# Patient Record
Sex: MALE | Race: WHITE | NOT HISPANIC OR LATINO | Employment: OTHER | URBAN - METROPOLITAN AREA
[De-identification: names, ages, dates, MRNs, and addresses within clinical notes are randomized per-mention and may not be internally consistent; named-entity substitution may affect disease eponyms.]

---

## 2017-11-06 ENCOUNTER — COMMUNICATION - HEALTHEAST (OUTPATIENT)
Dept: ADMINISTRATIVE | Facility: CLINIC | Age: 77
End: 2017-11-06

## 2017-12-08 ENCOUNTER — OFFICE VISIT - HEALTHEAST (OUTPATIENT)
Dept: CARDIOLOGY | Facility: CLINIC | Age: 77
End: 2017-12-08

## 2017-12-08 DIAGNOSIS — I25.10 CORONARY ARTERY DISEASE: ICD-10-CM

## 2017-12-08 ASSESSMENT — MIFFLIN-ST. JEOR: SCORE: 1610.77

## 2019-03-15 ENCOUNTER — COMMUNICATION - HEALTHEAST (OUTPATIENT)
Dept: TELEHEALTH | Facility: CLINIC | Age: 79
End: 2019-03-15

## 2019-05-08 ENCOUNTER — OFFICE VISIT - HEALTHEAST (OUTPATIENT)
Dept: CARDIOLOGY | Facility: CLINIC | Age: 79
End: 2019-05-08

## 2019-05-08 DIAGNOSIS — I10 ESSENTIAL HYPERTENSION: ICD-10-CM

## 2019-05-08 ASSESSMENT — MIFFLIN-ST. JEOR: SCORE: 1631.18

## 2020-05-05 ENCOUNTER — OFFICE VISIT - HEALTHEAST (OUTPATIENT)
Dept: CARDIOLOGY | Facility: CLINIC | Age: 80
End: 2020-05-05

## 2020-05-05 DIAGNOSIS — I10 HTN (HYPERTENSION): ICD-10-CM

## 2021-05-28 NOTE — PROGRESS NOTES
[facility] Heart Care Note    Assessment/Plan:    Dima Allen is a 78 y.o. old male with:  1. HTN - imrpoved control at home - on lisinopril/metoprolol, can addition of chlorthalidone or increase metoprolol 50mg at night.   If add third medicaiton consider renal artery US to screen for stenosis.  Doing exercise  2. CAD on atorvastastin/asp      Dr. Roby Card  [facility] HEART CARE  [phone]  ______________________________________________________________________    Subjective:    I had the opportunity to see Dima Allen at the [facility]. Dima Allen is a 78 y.o. male with a known history of HTN and cAD s/p PCI, recent increase in lisinopril 20mg two times a day for HTN, at home BP cuff was measuring pressure lower than at the office.  Saw Dr. Sarmiento and /90, increased lisinopril 20mg two times a day, along metoprolol 25mg BP at home measuring 119 to 132 SBP.  He admits his BP higher when see's physician.  At  Home 130/79.   No chest pain/pressure, syncopale vent,s PND, rothopnean, edema.   ______________________________________________________________________      Review of Systems:   General: WNL  Eyes: WNL  Ears/Nose/Throat: WNL  Lungs: WNL  Heart: WNL  Stomach: WNL  Bladder: WNL  Muscle/Joints: WNL  Skin: WNL  Nervous System: WNL  Mental Health: WNL     Blood: WNL    Problem List:  Patient Active Problem List   Diagnosis     Dyslipidemia, goal LDL below 70     Essential hypertension     Coronary Artery Disease     Medical History:  Past Medical History:   Diagnosis Date     Dyslipidemia, goal LDL below 70 2011     Essential hypertension      Left bundle branch block (LBBB) 2011     Surgical History:  Past Surgical History:   Procedure Laterality Date     CORONARY STENT PLACEMENT  2011    two ALLISON to RCA     Social History:  No pertinent social hx related to patient's chief complaint other than above in subjective        Family History:  No pertinent family hx related  "to patient's chief complaint other than above in subjective      Allergies:  No Known Allergies    Medications:  Current Outpatient Medications   Medication Sig Dispense Refill     ascorbic Acid (VITAMIN C) 500 mg CpER Take 500 mg by mouth daily.       aspirin 81 mg chewable tablet Chew 81 mg 2 (two) times a day.        atorvastatin (LIPITOR) 80 MG tablet Take 1 tablet (80 mg total) by mouth daily. 90 tablet 3     lisinopril (PRINIVIL,ZESTRIL) 20 MG tablet Take 1 tablet (20 mg total) by mouth daily. 90 tablet 3     metoprolol succinate (TOPROL-XL) 25 MG Take 1 tablet (25 mg total) by mouth daily. 90 tablet 3     mv-min-FA-Zc-Lj-dspdwyi-lutein (MULTIVITAL) 0.4-162-18 mg Tab Take 1 tablet by mouth daily.       omega-3 fatty acids-vitamin E (FISH OIL) 1,000 mg cap Take 1 capsule by mouth 2 (two) times a day.       No current facility-administered medications for this visit.        Objective:   Vital signs:  /80 (Patient Site: Right Arm, Patient Position: Sitting, Cuff Size: Adult Regular)   Pulse 84   Resp 16   Ht 5' 11\" (1.803 m)   Wt 196 lb (88.9 kg)   BMI 27.34 kg/m        Physical Exam:    GENERAL APPEARANCE: Alert, cooperative and in no acute distress.  HEENT: No scleral icterus. No Xanthelasma. Oral mucuos membranes pink and moist.  NECK: JVP<6cm. No Hepatojugular reflux. Thyroid not visualized  CHEST: clear to auscultation  CARDIOVASCULAR: S1, S2 without murmur ,clicks or rubs. Brachial, radial and posterior tibial pulses are intact and symetric. No carotid bruits noted.    ABDOMEN: Nontender. BS+. No bruits.  EXTREMITIES: No cyanosis, clubbing or edema.    Lab Results:  LIPIDS:  Lab Results   Component Value Date    CHOL 139 11/22/2016    CHOL 121 06/11/2014    CHOL 122 03/14/2014     Lab Results   Component Value Date    HDL 50 11/22/2016    HDL 46 06/11/2014    HDL 44 03/14/2014     Lab Results   Component Value Date    LDLCALC 77 11/22/2016    LDLCALC 65 06/11/2014    LDLCALC 66 03/14/2014     Lab " Results   Component Value Date    TRIG 60 11/22/2016    TRIG 48 06/11/2014    TRIG 57 03/14/2014     No components found for: CHOLHDL    BMP:  Lab Results   Component Value Date    CREATININE 1.33 (H) 07/22/2015    BUN 26 07/22/2015     07/22/2015    K 5.2 (H) 07/22/2015     (H) 07/22/2015    CO2 21 (L) 07/22/2015         Roby Card MD  [facility]  [phone]

## 2021-05-31 VITALS — HEIGHT: 71 IN | WEIGHT: 191.5 LBS | BODY MASS INDEX: 26.81 KG/M2

## 2021-06-03 ENCOUNTER — RECORDS - HEALTHEAST (OUTPATIENT)
Dept: ADMINISTRATIVE | Facility: CLINIC | Age: 81
End: 2021-06-03

## 2021-06-03 VITALS — HEIGHT: 71 IN | BODY MASS INDEX: 27.44 KG/M2 | WEIGHT: 196 LBS

## 2021-06-04 VITALS
SYSTOLIC BLOOD PRESSURE: 121 MMHG | BODY MASS INDEX: 26.64 KG/M2 | WEIGHT: 191 LBS | DIASTOLIC BLOOD PRESSURE: 72 MMHG | HEART RATE: 72 BPM

## 2021-06-07 NOTE — PROGRESS NOTES
Review of Systems - History obtained from the patient  General ROS: negative  Psychological ROS: negative  Ophthalmic ROS: negative  ENT ROS: negative  Allergy and Immunology ROS: negative  Hematological and Lymphatic ROS: negative  Endocrine ROS: negative  Respiratory ROS: negative  Cardiovascular ROS: negative  Gastrointestinal ROS: negative  Genito-Urinary ROS: negative  Musculoskeletal ROS: negative  Neurological ROS: negative  Dermatological ROS: negative

## 2021-06-14 NOTE — PROGRESS NOTES
[facility] Heart Care Note    Assessment/Plan:    Dima Allen is a 77 y.o. old male with:  1. CAD s/p PCI -doing well, cont with medications, exercising on regular basis.  Plans to go to [place] in the winter.      Follow up in 16 mo    Dr. Roby Card  [facility] HEART CARE  [phone]  ______________________________________________________________________    Subjective:    I had the opportunity to see Dima Allen at the [facility]. Dima Allen is a 77 y.o. male with a known history of CAD s/p  PCI, recent labs 11/17: LDL 67; HDL 52; trigl 43, Cr 1.38, going on the treadmill with interval training.  No complaints.    ______________________________________________________________________      Review of Systems:   General: WNL  Eyes: WNL  Ears/Nose/Throat: WNL  Lungs: WNL  Heart: WNL  Stomach: WNL  Bladder: WNL  Muscle/Joints: WNL  Skin: WNL  Nervous System: WNL  Mental Health: WNL     Blood: WNL    Problem List:  Patient Active Problem List   Diagnosis     Dyslipidemia, goal LDL below 70     Essential hypertension     Coronary Artery Disease     Medical History:  Past Medical History:   Diagnosis Date     Dyslipidemia, goal LDL below 70 2011     Essential hypertension      Left bundle branch block (LBBB) 2011     Surgical History:  Past Surgical History:   Procedure Laterality Date     CORONARY STENT PLACEMENT  2011    two ALLISON to RCA     Social History:  No pertinent social hx related to patient's chief complaint other than above in subjective        Family History:  No pertinent family hx related to patient's chief complaint other than above in subjective      Allergies:  No Known Allergies    Medications:  Current Outpatient Prescriptions   Medication Sig Dispense Refill     ascorbic Acid (VITAMIN C) 500 mg CpER Take 500 mg by mouth daily.       aspirin 81 mg chewable tablet Chew 81 mg 2 (two) times a day.        atorvastatin (LIPITOR) 80 MG tablet Take 1 tablet (80 mg total) by  "mouth daily. 90 tablet 3     lisinopril (PRINIVIL,ZESTRIL) 20 MG tablet Take 1 tablet (20 mg total) by mouth daily. 90 tablet 3     metoprolol succinate (TOPROL-XL) 25 MG Take 1 tablet (25 mg total) by mouth daily. 90 tablet 3     mv-min-FA-Sl-Re-ptzvznp-lutein (MULTIVITAL) 0.4-162-18 mg Tab Take 1 tablet by mouth daily.       omega-3 fatty acids-vitamin E (FISH OIL) 1,000 mg cap Take 1 capsule by mouth 2 (two) times a day.       No current facility-administered medications for this visit.        Objective:   Vital signs:  /84 (Patient Site: Right Arm, Patient Position: Sitting, Cuff Size: Adult Regular)  Pulse 82  Resp 20  Ht 5' 11\" (1.803 m)  Wt 191 lb 8 oz (86.9 kg)  BMI 26.71 kg/m2      Physical Exam:    GENERAL APPEARANCE: Alert, cooperative and in no acute distress.  HEENT: No scleral icterus. No Xanthelasma. Oral mucuos membranes pink and moist.  NECK: JVP<6cm. No Hepatojugular reflux. Thyroid not visualized  CHEST: clear to auscultation  CARDIOVASCULAR: S1, S2 without murmur ,clicks or rubs. Brachial, radial and posterior tibial pulses are intact and symetric. No carotid bruits noted.    ABDOMEN: Nontender. BS+. No bruits.  EXTREMITIES: No cyanosis, clubbing or edema.    Lab Results:  LIPIDS:  Lab Results   Component Value Date    CHOL 139 11/22/2016    CHOL 121 06/11/2014    CHOL 122 03/14/2014     Lab Results   Component Value Date    HDL 50 11/22/2016    HDL 46 06/11/2014    HDL 44 03/14/2014     Lab Results   Component Value Date    LDLCALC 77 11/22/2016    LDLCALC 65 06/11/2014    LDLCALC 66 03/14/2014     Lab Results   Component Value Date    TRIG 60 11/22/2016    TRIG 48 06/11/2014    TRIG 57 03/14/2014     No components found for: CHOLHDL    BMP:  Lab Results   Component Value Date    CREATININE 1.33 (H) 07/22/2015    BUN 26 07/22/2015     07/22/2015    K 5.2 (H) 07/22/2015     (H) 07/22/2015    CO2 21 (L) 07/22/2015         Roby Card MD  [facility] HEART " [place] [phone]

## 2021-06-29 NOTE — PROGRESS NOTES
"Progress Notes by Roby Card MD at 5/5/2020  9:30 AM     Author: Roby Card MD Service: -- Author Type: Physician    Filed: 5/8/2020 10:49 AM Encounter Date: 5/5/2020 Status: Addendum    : Roby Card MD (Physician)    Related Notes: Original Note by Roby Card MD (Physician) filed at 5/5/2020  9:34 AM         Thank you, Dr. Sarmiento, for asking the [facility] Heart Care team to see Mr. Dima Allen to evaluate       Assessment/Recommendations   Assessment/Plan:  1. CAD s/p PCI - doing well, cont medical therapy,   2. HTN - on medical therapy    Doing great - follow up in 1 year    Total time 10 min Phone visit        History of Present Illness/Subjective    Mr. Dima Allen is a 79 y.o. male with CAD s/pPCI, and HTN no chest pain/pressure, \"it's been a good year\", blood pressure well controlled, his BP is higher with seeing physician, no syncopal events, tolerates his medication, taking lisinopril two times a day and more helpful in BP congtrol and no dizzy spells. He notes wt gain after going to [place] in the winter but at home more active.  Talked about hyperkalemia and diet, avoidng or reducing bananas, fruits, chocolate and encourage water.         Physical Examination Review of Systems   Vitals:    05/05/20 0910   BP: 121/72   Pulse: 72     Body mass index is 26.64 kg/m .  Wt Readings from Last 3 Encounters:   05/05/20 191 lb (86.6 kg)   05/08/19 196 lb (88.9 kg)   12/08/17 191 lb 8 oz (86.9 kg)     [unfilled]  General Appearance:      ENT/Mouth:    EYES:     Neck:    Chest/Lungs:      Cardiovascular:      Abdomen:     Extremities:    Skin:    Neurologic:    Psychiatric:     Review of Systems - 12 points nega other than above      Medical History  Surgical History Family History Social History   Past Medical History:   Diagnosis Date   ? Dyslipidemia, goal LDL below 70 2011   ? Essential hypertension    ? Left bundle branch block (LBBB) 2011 "    Past Surgical History:   Procedure Laterality Date   ? CORONARY STENT PLACEMENT  2011    two ALLISON to RCA    Family History   Problem Relation Age of Onset   ? Arthritis Mother    ? Stroke Father    ? No Medical Problems Son    ? Coronary Stenting Son 37   ? Obesity Son    ? No Medical Problems Daughter     Social History     Socioeconomic History   ? Marital status:      Spouse name: Lynda   ? Number of children: 3   ? Years of education: Not on file   ? Highest education level: Not on file   Occupational History   ? Occupation: mechanical/     Employer: [employer]     Comment: retired   Social Needs   ? Financial resource strain: Not on file   ? Food insecurity     Worry: Not on file     Inability: Not on file   ? Transportation needs     Medical: Not on file     Non-medical: Not on file   Tobacco Use   ? Smoking status: Never Smoker   ? Smokeless tobacco: Never Used   Substance and Sexual Activity   ? Alcohol use: Yes     Comment: wine rarely   ? Drug use: No   ? Sexual activity: Yes     Partners: Female     Birth control/protection: Post-menopausal   Lifestyle   ? Physical activity     Days per week: Not on file     Minutes per session: Not on file   ? Stress: Not on file   Relationships   ? Social connections     Talks on phone: Not on file     Gets together: Not on file     Attends Holiness service: Not on file     Active member of club or organization: Not on file     Attends meetings of clubs or organizations: Not on file     Relationship status: Not on file   ? Intimate partner violence     Fear of current or ex partner: Not on file     Emotionally abused: Not on file     Physically abused: Not on file     Forced sexual activity: Not on file   Other Topics Concern   ? Not on file   Social History Narrative    Studied at [employer] in [place].  He is originally from [place], [place].          Medications  Allergies   Scheduled Meds:  Continuous Infusions:  PRN  Meds:. No Known Allergies      Lab Results    Chemistry/lipid CBC Cardiac Enzymes/BNP/TSH/INR   Lab Results   Component Value Date    CHOL 139 11/22/2016    HDL 50 11/22/2016    LDLCALC 77 11/22/2016    TRIG 60 11/22/2016    CREATININE 1.33 (H) 07/22/2015    BUN 26 07/22/2015    K 5.2 (H) 07/22/2015     07/22/2015     (H) 07/22/2015    CO2 21 (L) 07/22/2015    Lab Results   Component Value Date    WBC 5.5 07/07/2015    HGB 13.2 (L) 07/07/2015    HCT 39.8 (L) 07/07/2015    MCV 88 07/07/2015     07/07/2015    Lab Results   Component Value Date    CKTOTAL 149 06/06/2011    CKMB 1 06/06/2011    TROPONINI 0.76 (HH) 06/06/2011    TSH 3.3 06/06/2011    INR 0.99 06/09/2011              Roby Card MD  Interventional Cardiology  [facility]

## 2021-08-21 ENCOUNTER — HEALTH MAINTENANCE LETTER (OUTPATIENT)
Age: 81
End: 2021-08-21

## 2021-09-30 ENCOUNTER — OFFICE VISIT (OUTPATIENT)
Dept: CARDIOLOGY | Facility: CLINIC | Age: 81
End: 2021-09-30
Payer: MEDICARE

## 2021-09-30 VITALS
RESPIRATION RATE: 16 BRPM | OXYGEN SATURATION: 99 % | SYSTOLIC BLOOD PRESSURE: 142 MMHG | BODY MASS INDEX: 26.78 KG/M2 | DIASTOLIC BLOOD PRESSURE: 70 MMHG | WEIGHT: 192 LBS | HEART RATE: 75 BPM

## 2021-09-30 DIAGNOSIS — E78.5 HYPERLIPIDEMIA LDL GOAL <70: ICD-10-CM

## 2021-09-30 DIAGNOSIS — I25.10 CORONARY ARTERY DISEASE INVOLVING NATIVE CORONARY ARTERY OF NATIVE HEART WITHOUT ANGINA PECTORIS: Primary | ICD-10-CM

## 2021-09-30 PROCEDURE — 99214 OFFICE O/P EST MOD 30 MIN: CPT | Performed by: INTERNAL MEDICINE

## 2021-09-30 RX ORDER — CHLORAL HYDRATE 500 MG
1000 CAPSULE ORAL 2 TIMES DAILY
COMMUNITY
Start: 2020-01-06

## 2021-09-30 NOTE — LETTER
9/30/2021    Reinaldo Sarmiento MD  [facility] [address]  [place] [address]    RE: Dima Allen       Dear Colleague,    I had the pleasure of seeing Dima Allen in the [facility].      Thank you, Dr. Davis ref. provider found, for asking the [facility] Heart Care team to see Mr. Dima Allen to evaluate       Assessment/Recommendations   Assessment/Plan:  1. CV prevention - s/p PCI - discussed goal 70mg/dL LDL, will repeat lipids and if not at target will change atorvastatin to rosuvastatin 40mg.  Cont metoprolol and asp  2. HTN well controlled defer to Dr. Sarmiento    Follow up in one year     History of Present Illness/Subjective    Mr. Dima Allen is a 81 year old male with hx of CAD s/p PCI, on atorvstatin 80mg, asp 162mg, fish oil, metoprolol 25mg, lisinopirl 20mg bid - noted high potassium and lowered to 20mg dial and hydrochlorothiazide 12.5mg add but BP too low.  Tried variety of dosing of diuretic and stopped.  He tracks BP and range 115 to 130 SBP at home, potassium down to normal.   Last lipidsl LDL 80mg/dL and hDL 49mg/dL.   No chest pina/pressure, dyspnea on exertion, no PND/orthopnea, edema, syncopal events, palpitations.           Physical Examination Review of Systems   BP (!) 142/70 (BP Location: Right arm, Patient Position: Sitting, Cuff Size: Adult Regular)   Pulse 75   Resp 16   Wt 87.1 kg (192 lb)   SpO2 99%   BMI 26.78 kg/m    Body mass index is 26.78 kg/m .  Wt Readings from Last 3 Encounters:   09/30/21 87.1 kg (192 lb)   05/05/20 86.6 kg (191 lb)   05/08/19 88.9 kg (196 lb)     [unfilled]  General Appearance:   no distress, normal body habitus   ENT/Mouth: membranes moist, no oral lesions or bleeding gums.      EYES:  no scleral icterus, normal conjunctivae   Neck: no carotid bruits or thyromegaly   Chest/Lungs:   lungs are clear to auscultation, no rales or wheezing,   sternal scar, equal chest wall expansion    Cardiovascular:   Regular. Normal first and second heart sounds with no murmurs, rubs, or gallops; the carotid, radial and posterior tibial pulses are intact, Jugular venous pressure , edema bilaterally    Abdomen:  no organomegaly, masses, bruits, or tenderness; bowel sounds are present   Extremities: no cyanosis or clubbing   Skin: no xanthelasma, warm.    Neurologic: normal  bilateral, no tremors     Psychiatric: alert and oriented x3, calm     Review of Systems - 12 points nega other than above      Medical History  Surgical History Family History Social History   Past Medical History:   Diagnosis Date     Dyslipidemia, goal LDL below 70 2011     Essential hypertension      Left bundle branch block (LBBB) 2011    Past Surgical History:   Procedure Laterality Date     CORONARY STENT PLACEMENT  2011    two ALLISON to RCA    Family History   Problem Relation Age of Onset     Arthritis Mother      Cerebrovascular Disease Father      No Known Problems Son      Coronary Stenting Son 37.00     Obesity Son      No Known Problems Daughter     Social History     Socioeconomic History     Marital status:      Spouse name: Not on file     Number of children: 3     Years of education: Not on file     Highest education level: Not on file   Occupational History     Not on file   Tobacco Use     Smoking status: Never Smoker     Smokeless tobacco: Never Used   Substance and Sexual Activity     Alcohol use: Yes     Comment: Alcoholic Drinks/day: wine rarely     Drug use: No     Sexual activity: Yes     Partners: Female     Birth control/protection: Post-menopausal   Other Topics Concern     Not on file   Social History Narrative    Studied at [employer] in [place].  He is originally from [place], [place].     Social Determinants of Health     Financial Resource Strain:      Difficulty of Paying Living Expenses:    Food Insecurity:      Worried About Running Out of  Food in the Last Year:      Ran Out of Food in the Last Year:    Transportation Needs:      Lack of Transportation (Medical):      Lack of Transportation (Non-Medical):    Physical Activity:      Days of Exercise per Week:      Minutes of Exercise per Session:    Stress:      Feeling of Stress :    Social Connections:      Frequency of Communication with Friends and Family:      Frequency of Social Gatherings with Friends and Family:      Attends Voodoo Services:      Active Member of Clubs or Organizations:      Attends Club or Organization Meetings:      Marital Status:    Intimate Partner Violence:      Fear of Current or Ex-Partner:      Emotionally Abused:      Physically Abused:      Sexually Abused:           Medications  Allergies   Scheduled Meds:  Continuous Infusions:  PRN Meds:. No Known Allergies      Lab Results    Chemistry/lipid CBC Cardiac Enzymes/BNP/TSH/INR   Lab Results   Component Value Date    CHOL 139 11/22/2016    HDL 50 11/22/2016    TRIG 60 11/22/2016    No results found for: WBC, HGB, HCT, MCV, PLT No results found for: CKTOTAL, CKMB, TROPONINI, BNP, TSH, INR           Roby Card MD  Interventional Cardiology  [facility]                Thank you for allowing me to participate in the care of your patient.      Sincerely,     Roby Card MD     [facility] Heart Care  cc:   No referring provider defined for this encounter.

## 2021-09-30 NOTE — PROGRESS NOTES
Thank you, Dr. Davis ref. provider found, for asking the [facility] Heart Care team to see Mr. Dima Allen to evaluate       Assessment/Recommendations   Assessment/Plan:  1. CV prevention - s/p PCI - discussed goal 70mg/dL LDL, will repeat lipids and if not at target will change atorvastatin to rosuvastatin 40mg.  Cont metoprolol and asp  2. HTN well controlled defer to Dr. Sarmiento    Follow up in one year     History of Present Illness/Subjective    Mr. Dima Allen is a 81 year old male with hx of CAD s/p PCI, on atorvstatin 80mg, asp 162mg, fish oil, metoprolol 25mg, lisinopirl 20mg bid - noted high potassium and lowered to 20mg dial and hydrochlorothiazide 12.5mg add but BP too low.  Tried variety of dosing of diuretic and stopped.  He tracks BP and range 115 to 130 SBP at home, potassium down to normal.   Last lipidsl LDL 80mg/dL and hDL 49mg/dL.   No chest pina/pressure, dyspnea on exertion, no PND/orthopnea, edema, syncopal events, palpitations.           Physical Examination Review of Systems   BP (!) 142/70 (BP Location: Right arm, Patient Position: Sitting, Cuff Size: Adult Regular)   Pulse 75   Resp 16   Wt 87.1 kg (192 lb)   SpO2 99%   BMI 26.78 kg/m    Body mass index is 26.78 kg/m .  Wt Readings from Last 3 Encounters:   09/30/21 87.1 kg (192 lb)   05/05/20 86.6 kg (191 lb)   05/08/19 88.9 kg (196 lb)     [unfilled]  General Appearance:   no distress, normal body habitus   ENT/Mouth: membranes moist, no oral lesions or bleeding gums.      EYES:  no scleral icterus, normal conjunctivae   Neck: no carotid bruits or thyromegaly   Chest/Lungs:   lungs are clear to auscultation, no rales or wheezing,  sternal scar, equal chest wall expansion    Cardiovascular:   Regular. Normal first and second heart sounds with no murmurs, rubs, or gallops; the carotid, radial and posterior tibial pulses are intact, Jugular venous pressure , edema bilaterally    Abdomen:  no organomegaly, masses, bruits,  or tenderness; bowel sounds are present   Extremities: no cyanosis or clubbing   Skin: no xanthelasma, warm.    Neurologic: normal  bilateral, no tremors     Psychiatric: alert and oriented x3, calm     Review of Systems - 12 points nega other than above      Medical History  Surgical History Family History Social History   Past Medical History:   Diagnosis Date     Dyslipidemia, goal LDL below 70 2011     Essential hypertension      Left bundle branch block (LBBB) 2011    Past Surgical History:   Procedure Laterality Date     CORONARY STENT PLACEMENT  2011    two ALLISON to RCA    Family History   Problem Relation Age of Onset     Arthritis Mother      Cerebrovascular Disease Father      No Known Problems Son      Coronary Stenting Son 37.00     Obesity Son      No Known Problems Daughter     Social History     Socioeconomic History     Marital status:      Spouse name: Not on file     Number of children: 3     Years of education: Not on file     Highest education level: Not on file   Occupational History     Not on file   Tobacco Use     Smoking status: Never Smoker     Smokeless tobacco: Never Used   Substance and Sexual Activity     Alcohol use: Yes     Comment: Alcoholic Drinks/day: wine rarely     Drug use: No     Sexual activity: Yes     Partners: Female     Birth control/protection: Post-menopausal   Other Topics Concern     Not on file   Social History Narrative    Studied at [employer] in [place].  He is originally from [place], [place].     Social Determinants of Health     Financial Resource Strain:      Difficulty of Paying Living Expenses:    Food Insecurity:      Worried About Running Out of Food in the Last Year:      Ran Out of Food in the Last Year:    Transportation Needs:      Lack of Transportation (Medical):      Lack of Transportation (Non-Medical):    Physical Activity:      Days of Exercise per Week:      Minutes of Exercise per Session:    Stress:      Feeling of Stress  :    Social Connections:      Frequency of Communication with Friends and Family:      Frequency of Social Gatherings with Friends and Family:      Attends Sabianism Services:      Active Member of Clubs or Organizations:      Attends Club or Organization Meetings:      Marital Status:    Intimate Partner Violence:      Fear of Current or Ex-Partner:      Emotionally Abused:      Physically Abused:      Sexually Abused:           Medications  Allergies   Scheduled Meds:  Continuous Infusions:  PRN Meds:. No Known Allergies      Lab Results    Chemistry/lipid CBC Cardiac Enzymes/BNP/TSH/INR   Lab Results   Component Value Date    CHOL 139 11/22/2016    HDL 50 11/22/2016    TRIG 60 11/22/2016    No results found for: WBC, HGB, HCT, MCV, PLT No results found for: CKTOTAL, CKMB, TROPONINI, BNP, TSH, INR           Roby Card MD  Interventional Cardiology  [facility]

## 2021-10-16 ENCOUNTER — HEALTH MAINTENANCE LETTER (OUTPATIENT)
Age: 81
End: 2021-10-16

## 2022-10-01 ENCOUNTER — HEALTH MAINTENANCE LETTER (OUTPATIENT)
Age: 82
End: 2022-10-01

## 2022-11-03 ENCOUNTER — OFFICE VISIT (OUTPATIENT)
Dept: CARDIOLOGY | Facility: CLINIC | Age: 82
End: 2022-11-03
Payer: MEDICARE

## 2022-11-03 VITALS
SYSTOLIC BLOOD PRESSURE: 168 MMHG | RESPIRATION RATE: 20 BRPM | HEART RATE: 76 BPM | DIASTOLIC BLOOD PRESSURE: 84 MMHG | HEIGHT: 72 IN | WEIGHT: 192.7 LBS | BODY MASS INDEX: 26.1 KG/M2

## 2022-11-03 DIAGNOSIS — I25.10 CORONARY ARTERY DISEASE INVOLVING NATIVE CORONARY ARTERY OF NATIVE HEART WITHOUT ANGINA PECTORIS: Primary | ICD-10-CM

## 2022-11-03 PROCEDURE — 99214 OFFICE O/P EST MOD 30 MIN: CPT | Performed by: INTERNAL MEDICINE

## 2022-11-03 NOTE — PROGRESS NOTES
HEART CARE ENCOUNTER CONSULTATON NOTE      M [facility]  [phone]      Assessment/Recommendations   Assessment:  1. Coronary artery disease: status post PCI of RCA x 2 in 2011.  No anginal complaints.  2. Hypertension: elevated in the office but well controlled at home.  Continue home BP monitoring.  No medication changes today.  3. Dyslipidemia: lipids well controlled on lipitor    Plan:  1. Continue aspirin and high dose statin therapy  2. Continue on metoprolol and lisinopril  Follow up in one year       History of Present Illness/Subjective    HPI: Dima Allen is a 82 year old male with history of coronary artery disease status post PCI of RCA x 2 2011, LBBB, hypertension, dyslipidemia who I am seeing today to establish care.  He was previously followed by Dr Card.  He is active.  He does a lot of work around the yard and in the winter exercises on the treadmill for 30 minutes three times a week.  Adjustments have been made to his antihypertensives.  He was tried on hydrochlorothiazide and higher dose lisinopril and did not feel well with this.  He feels good on his current therapy. Despite blood pressure being elevated today his blood pressure is normally better controlled at home.  He brought in his numbers and they run 120/60s.           Physical Examination  Review of Systems   Vitals: BP (!) 168/84 (BP Location: Left arm, Patient Position: Sitting, Cuff Size: Adult Regular)   Pulse 76   Resp 20   Ht 1.829 m (6')   Wt 87.4 kg (192 lb 11.2 oz)   BMI 26.13 kg/m    BMI= Body mass index is 26.13 kg/m .  Wt Readings from Last 3 Encounters:   11/03/22 87.4 kg (192 lb 11.2 oz)   09/30/21 87.1 kg (192 lb)   05/05/20 86.6 kg (191 lb)       General Appearance:   no distress, normal body habitus   ENT/Mouth: membranes moist, no oral lesions or bleeding gums.      EYES:  no scleral icterus, normal conjunctivae   Neck: no carotid bruits or thyromegaly   Chest/Lungs:   lungs are clear  to auscultation   Cardiovascular:   Regular. Normal first and second heart sounds with no murmurs no edema bilaterally    Abdomen:  no organomegaly, masses, bruits, or tenderness; bowel sounds are present   Extremities: no cyanosis or clubbing   Skin: no xanthelasma, warm.    Neurologic: normal  bilateral, no tremors     Psychiatric: alert and oriented x3, calm        Please refer above for cardiac ROS details.        Medical History  Surgical History Family History Social History   Past Medical History:   Diagnosis Date     Dyslipidemia, goal LDL below 70 2011     Essential hypertension      Left bundle branch block (LBBB) 2011     Past Surgical History:   Procedure Laterality Date     CORONARY STENT PLACEMENT  2011    two ALLISON to RCA     Family History   Problem Relation Age of Onset     Arthritis Mother      Cerebrovascular Disease Father      No Known Problems Son      Coronary Stenting Son 37.00     Obesity Son      No Known Problems Daughter         Social History     Socioeconomic History     Marital status:      Spouse name: Not on file     Number of children: 3     Years of education: Not on file     Highest education level: Not on file   Occupational History     Not on file   Tobacco Use     Smoking status: Never     Smokeless tobacco: Never   Vaping Use     Vaping Use: Never used   Substance and Sexual Activity     Alcohol use: Yes     Comment: Alcoholic Drinks/day: wine rarely     Drug use: No     Sexual activity: Yes     Partners: Female     Birth control/protection: Post-menopausal   Other Topics Concern     Not on file   Social History Narrative    Studied at [employer] in [place].  He is originally from [place], [place].     Social Determinants of Health     Financial Resource Strain: Not on file   Food Insecurity: Not on file   Transportation Needs: Not on file   Physical Activity: Not on file   Stress: Not on file   Social Connections: Not on file   Intimate Partner  Violence: Not on file   Housing Stability: Not on file           Medications  Allergies   Current Outpatient Medications   Medication Sig Dispense Refill     ascorbic Acid (VITAMIN C) 500 mg CpER [ASCORBIC ACID (VITAMIN C) 500 MG CPER] Take 500 mg by mouth daily.       aspirin 81 mg chewable tablet [ASPIRIN 81 MG CHEWABLE TABLET] Chew 81 mg 2 (two) times a day.        atorvastatin (LIPITOR) 80 MG tablet [ATORVASTATIN (LIPITOR) 80 MG TABLET] Take 1 tablet (80 mg total) by mouth daily. 90 tablet 3     fish oil-omega-3 fatty acids 1000 MG capsule Take 1,000 mg by mouth 2 times daily       lisinopril (PRINIVIL,ZESTRIL) 20 MG tablet [LISINOPRIL (PRINIVIL,ZESTRIL) 20 MG TABLET] Take 1 tablet (20 mg total) by mouth daily. 90 tablet 3     metoprolol succinate (TOPROL-XL) 25 MG [METOPROLOL SUCCINATE (TOPROL-XL) 25 MG] Take 1 tablet (25 mg total) by mouth daily. 90 tablet 3     mv-min-FA-Wx-Gu-heacezr-lutein (MULTIVITAL) 0.4-162-18 mg Tab [MV-MIN-FA-TR-FW-PKTHBNB-LUTEIN (MULTIVITAL) 0.4-162-18 MG TAB] Take 1 tablet by mouth daily.       No Known Allergies       Lab Results    Chemistry/lipid CBC Cardiac Enzymes/BNP/TSH/INR   Recent Labs   Lab Test 11/22/16  1034   CHOL 139   HDL 50   LDL 77   TRIG 60     Recent Labs   Lab Test 11/22/16  1034   LDL 77     No results for input(s): NA, POTASSIUM, CHLORIDE, CO2, GLC, BUN, CR, GFRESTIMATED, MARY in the last 64164 hours.    Invalid input(s): GRFESTBLACK  No results for input(s): CR in the last 24334 hours.  No results for input(s): A1C in the last 42757 hours.       No results for input(s): WBC, HGB, HCT, MCV, PLT in the last 15292 hours.  No results for input(s): HGB in the last 54431 hours. No results for input(s): TROPONINI in the last 41740 hours.  No results for input(s): BNP, NTBNPI, NTBNP in the last 51095 hours.  No results for input(s): TSH in the last 55983 hours.  No results for input(s): INR in the last 71680 hours.     Grace Zhao MD

## 2022-11-03 NOTE — LETTER
11/3/2022    Reinaldo Sarmiento MD  [facility] [address]  [facility] [address]    RE: Dima Allen       Dear Colleague,     I had the pleasure of seeing Dima Allen in the [facility].    HEART CARE ENCOUNTER CONSULTATON NOTE      [facility]  [phone]      Assessment/Recommendations   Assessment:  1. Coronary artery disease: status post PCI of RCA x 2 in 2011.  No anginal complaints.  2. Hypertension: elevated in the office but well controlled at home.  Continue home BP monitoring.  No medication changes today.  3. Dyslipidemia: lipids well controlled on lipitor    Plan:  1. Continue aspirin and high dose statin therapy  2. Continue on metoprolol and lisinopril  Follow up in one year       History of Present Illness/Subjective    HPI: Dima Allen is a 82 year old male with history of coronary artery disease status post PCI of RCA x 2 2011, LBBB, hypertension, dyslipidemia who I am seeing today to establish care.  He was previously followed by Dr Card.  He is active.  He does a lot of work around the yard and in the winter exercises on the treadmill for 30 minutes three times a week.  Adjustments have been made to his antihypertensives.  He was tried on hydrochlorothiazide and higher dose lisinopril and did not feel well with this.  He feels good on his current therapy. Despite blood pressure being elevated today his blood pressure is normally better controlled at home.  He brought in his numbers and they run 120/60s.           Physical Examination  Review of Systems   Vitals: BP (!) 168/84 (BP Location: Left arm, Patient Position: Sitting, Cuff Size: Adult Regular)   Pulse 76   Resp 20   Ht 1.829 m (6')   Wt 87.4 kg (192 lb 11.2 oz)   BMI 26.13 kg/m    BMI= Body mass index is 26.13 kg/m .  Wt Readings from Last 3 Encounters:   11/03/22 87.4 kg (192 lb 11.2 oz)   09/30/21 87.1 kg (192 lb)   05/05/20 86.6 kg (191 lb)        General Appearance:   no distress, normal body habitus   ENT/Mouth: membranes moist, no oral lesions or bleeding gums.      EYES:  no scleral icterus, normal conjunctivae   Neck: no carotid bruits or thyromegaly   Chest/Lungs:   lungs are clear to auscultation   Cardiovascular:   Regular. Normal first and second heart sounds with no murmurs no edema bilaterally    Abdomen:  no organomegaly, masses, bruits, or tenderness; bowel sounds are present   Extremities: no cyanosis or clubbing   Skin: no xanthelasma, warm.    Neurologic: normal  bilateral, no tremors     Psychiatric: alert and oriented x3, calm        Please refer above for cardiac ROS details.        Medical History  Surgical History Family History Social History   Past Medical History:   Diagnosis Date     Dyslipidemia, goal LDL below 70 2011     Essential hypertension      Left bundle branch block (LBBB) 2011     Past Surgical History:   Procedure Laterality Date     CORONARY STENT PLACEMENT  2011    two ALLISON to RCA     Family History   Problem Relation Age of Onset     Arthritis Mother      Cerebrovascular Disease Father      No Known Problems Son      Coronary Stenting Son 37.00     Obesity Son      No Known Problems Daughter         Social History     Socioeconomic History     Marital status:      Spouse name: Not on file     Number of children: 3     Years of education: Not on file     Highest education level: Not on file   Occupational History     Not on file   Tobacco Use     Smoking status: Never     Smokeless tobacco: Never   Vaping Use     Vaping Use: Never used   Substance and Sexual Activity     Alcohol use: Yes     Comment: Alcoholic Drinks/day: wine rarely     Drug use: No     Sexual activity: Yes     Partners: Female     Birth control/protection: Post-menopausal   Other Topics Concern     Not on file   Social History Narrative    Studied at [employer] in [place].  He is originally from [place], [place].      Social Determinants of Health     Financial Resource Strain: Not on file   Food Insecurity: Not on file   Transportation Needs: Not on file   Physical Activity: Not on file   Stress: Not on file   Social Connections: Not on file   Intimate Partner Violence: Not on file   Housing Stability: Not on file           Medications  Allergies   Current Outpatient Medications   Medication Sig Dispense Refill     ascorbic Acid (VITAMIN C) 500 mg CpER [ASCORBIC ACID (VITAMIN C) 500 MG CPER] Take 500 mg by mouth daily.       aspirin 81 mg chewable tablet [ASPIRIN 81 MG CHEWABLE TABLET] Chew 81 mg 2 (two) times a day.        atorvastatin (LIPITOR) 80 MG tablet [ATORVASTATIN (LIPITOR) 80 MG TABLET] Take 1 tablet (80 mg total) by mouth daily. 90 tablet 3     fish oil-omega-3 fatty acids 1000 MG capsule Take 1,000 mg by mouth 2 times daily       lisinopril (PRINIVIL,ZESTRIL) 20 MG tablet [LISINOPRIL (PRINIVIL,ZESTRIL) 20 MG TABLET] Take 1 tablet (20 mg total) by mouth daily. 90 tablet 3     metoprolol succinate (TOPROL-XL) 25 MG [METOPROLOL SUCCINATE (TOPROL-XL) 25 MG] Take 1 tablet (25 mg total) by mouth daily. 90 tablet 3     mv-min-FA-Mq-Ij-sahhrla-lutein (MULTIVITAL) 0.4-162-18 mg Tab [MV-MIN-FA-PK-JK-ZUKOHJE-LUTEIN (MULTIVITAL) 0.4-162-18 MG TAB] Take 1 tablet by mouth daily.       No Known Allergies       Lab Results    Chemistry/lipid CBC Cardiac Enzymes/BNP/TSH/INR   Recent Labs   Lab Test 11/22/16  1034   CHOL 139   HDL 50   LDL 77   TRIG 60     Recent Labs   Lab Test 11/22/16  1034   LDL 77     No results for input(s): NA, POTASSIUM, CHLORIDE, CO2, GLC, BUN, CR, GFRESTIMATED, MARY in the last 03568 hours.    Invalid input(s): GRFESTBLACK  No results for input(s): CR in the last 91697 hours.  No results for input(s): A1C in the last 75223 hours.       No results for input(s): WBC, HGB, HCT, MCV, PLT in the last 67358 hours.  No results for input(s): HGB in the last 73000 hours. No results for input(s): TROPONINI in the  last 81269 hours.  No results for input(s): BNP, NTBNPI, NTBNP in the last 33684 hours.  No results for input(s): TSH in the last 54478 hours.  No results for input(s): INR in the last 70828 hours.     Grace Zhao MD          Thank you for allowing me to participate in the care of your patient.      Sincerely,     Grace Zhao MD     [facility]  cc:   Roby Card MD  [address] [address]

## 2023-05-13 ENCOUNTER — HEALTH MAINTENANCE LETTER (OUTPATIENT)
Age: 83
End: 2023-05-13

## 2024-03-14 ENCOUNTER — OFFICE VISIT (OUTPATIENT)
Dept: CARDIOLOGY | Facility: CLINIC | Age: 84
End: 2024-03-14
Payer: MEDICARE

## 2024-03-14 VITALS
HEART RATE: 76 BPM | RESPIRATION RATE: 20 BRPM | OXYGEN SATURATION: 97 % | BODY MASS INDEX: 26.37 KG/M2 | SYSTOLIC BLOOD PRESSURE: 160 MMHG | WEIGHT: 194.4 LBS | DIASTOLIC BLOOD PRESSURE: 72 MMHG

## 2024-03-14 DIAGNOSIS — I25.10 CORONARY ARTERY DISEASE INVOLVING NATIVE CORONARY ARTERY OF NATIVE HEART WITHOUT ANGINA PECTORIS: Primary | ICD-10-CM

## 2024-03-14 PROCEDURE — 99214 OFFICE O/P EST MOD 30 MIN: CPT | Performed by: INTERNAL MEDICINE

## 2024-03-14 RX ORDER — AMLODIPINE BESYLATE 5 MG/1
5 TABLET ORAL DAILY
COMMUNITY
Start: 2024-02-20

## 2024-03-14 NOTE — LETTER
3/14/2024    Reinaldo Sarmiento MD  [address]  [facility] [address]    RE: Dima Allen       Dear Colleague,     I had the [facility] of seeing Dima Allen in the [facility].    Thank you, Dr. Davis ref. provider found, for asking the [facility] team to see Mr. Dima Allen to evaluate       Assessment/Recommendations   Assessment/Plan:  CAD no angina, doing well, on asp/statin  HTN - on three medications now, noted hydrochlorothiazide with lower blood pressure last year, now BP at home normal <130/80, encouraged exercise, building muscle strength, track BP, noted white coat HTN  CV prevention -discussed if concern for muscle weakness related to statin, can hold for 2-4 weeks and then restart to see if culprit - then if so, consider alternative statin or adding coenzyme Q10.   Would lower fish oil to one a day given potential risk for atrial fibrillation         History of Present Illness/Subjective    Mr. Dima Allen is a 83 year old male with CAD s/p PCI to RCA in 2011, LDL 63, BP at MD office 150, started on amlodipine, BP at home 120 with omron arm cuff, no edema, no PND/orthoepna, no dyspnea on exertion, chest pain /pressure on exertion, went to [place] [employer] [place] walks, 8-10 miles each day, kept up with grand children until last day, ages 7 to 21.  No syncopal events, no palpitations, stress higher with concern for children and grandchildren.      /80 repeat     Physical Examination Review of Systems   BP (!) 160/72 (BP Location: Right arm, Patient Position: Sitting, Cuff Size: Adult Regular)   Pulse 76   Resp 20   Wt 88.2 kg (194 lb 6.4 oz)   SpO2 97%   BMI 26.37 kg/m    Body mass index is 26.37 kg/m .  Wt Readings from Last 3 Encounters:   03/14/24 88.2 kg (194 lb 6.4 oz)   11/03/22 87.4 kg (192 lb 11.2 oz)   09/30/21 87.1 kg (192 lb)     [unfilled]  General Appearance:   no distress, normal body habitus   ENT/Mouth:  membranes moist, no oral lesions or bleeding gums.      EYES:  no scleral icterus, normal conjunctivae   Neck: no carotid bruits or thyromegaly   Chest/Lungs:   lungs are clear to auscultation, no rales or wheezing,  sternal scar, equal chest wall expansion    Cardiovascular:   Regular. Normal first and second heart sounds with no murmurs, rubs, or gallops; the carotid, radial and posterior tibial pulses are intact, Jugular venous pressure , edema bilaterally    Abdomen:  no organomegaly, masses, bruits, or tenderness; bowel sounds are present   Extremities: no cyanosis or clubbing   Skin: no xanthelasma, warm.    Neurologic: normal  bilateral, no tremors     Psychiatric: alert and oriented x3, calm     Review of Systems - 12 points nega other than above      Medical History  Surgical History Family History Social History   Past Medical History:   Diagnosis Date    Dyslipidemia, goal LDL below 70 2011    Essential hypertension     Left bundle branch block (LBBB) 2011    Past Surgical History:   Procedure Laterality Date    CORONARY STENT PLACEMENT  2011    two ALLISON to RCA    Family History   Problem Relation Age of Onset    Arthritis Mother     Cerebrovascular Disease Father     No Known Problems Son     Coronary Stenting Son 37.00    Obesity Son     No Known Problems Daughter     Social History     Socioeconomic History    Marital status:      Spouse name: Not on file    Number of children: 3    Years of education: Not on file    Highest education level: Not on file   Occupational History    Not on file   Tobacco Use    Smoking status: Never    Smokeless tobacco: Never   Vaping Use    Vaping Use: Never used   Substance and Sexual Activity    Alcohol use: Yes     Comment: Alcoholic Drinks/day: wine rarely    Drug use: No    Sexual activity: Yes     Partners: Female     Birth control/protection: Post-menopausal   Other Topics Concern    Not on file   Social History Narrative    Studied at [employer]  "in [place].  He is originally from [place], [place].     Social Determinants of Health     Financial Resource Strain: Not on file   Food Insecurity: Not on file   Transportation Needs: Not on file   Physical Activity: Not on file   Stress: Not on file   Social Connections: Not on file   Interpersonal Safety: Not on file   Housing Stability: Not on file          Medications  Allergies   Scheduled Meds:  Continuous Infusions:  PRN Meds:. Allergies   Allergen Reactions    Hydrochlorothiazide Dizziness         Lab Results    Chemistry/lipid CBC Cardiac Enzymes/BNP/TSH/INR   Lab Results   Component Value Date    CHOL 139 11/22/2016    HDL 50 11/22/2016    TRIG 60 11/22/2016    No results found for: \"WBC\", \"HGB\", \"HCT\", \"MCV\", \"PLT\" No results found for: \"CKTOTAL\", \"CKMB\", \"TROPONINI\", \"BNP\", \"TSH\", \"INR\"           Roby Card MD  Interventional Cardiology  [facility]              Thank you for allowing me to participate in the care of your patient.      Sincerely,     Roby Card MD     [facility]  cc:   No referring provider defined for this encounter.      "

## 2024-03-14 NOTE — PATIENT INSTRUCTIONS
CAD no angina, doing well, on asp/statin  HTN - on three medications now, noted hydrochlorothiazide with lower blood pressure last year, now BP at home normal <130/80, encouraged exercise, building muscle strength, track BP, noted white coat HTN  CV prevention -discussed if concern for muscle weakness related to statin, can hold for 2-4 weeks and then restart to see if culprit - then if so, consider alternative statin or adding coenzyme Q10.   Would lower fish oil to one a day given potential risk for atrial fibrillation

## 2024-03-14 NOTE — PROGRESS NOTES
Thank you, Dr. Davis ref. provider found, for asking the [facility] team to see Mr. Dima Allen to evaluate       Assessment/Recommendations   Assessment/Plan:  CAD no angina, doing well, on asp/statin  HTN - on three medications now, noted hydrochlorothiazide with lower blood pressure last year, now BP at home normal <130/80, encouraged exercise, building muscle strength, track BP, noted white coat HTN  CV prevention -discussed if concern for muscle weakness related to statin, can hold for 2-4 weeks and then restart to see if culprit - then if so, consider alternative statin or adding coenzyme Q10.   Would lower fish oil to one a day given potential risk for atrial fibrillation         History of Present Illness/Subjective    Mr. Dima Allen is a 83 year old male with CAD s/p PCI to RCA in 2011, LDL 63, BP at MD office 150, started on amlodipine, BP at home 120 with omron arm cuff, no edema, no PND/orthoepna, no dyspnea on exertion, chest pain /pressure on exertion, went to [place] [employer] to [place] walks, 8-10 miles each day, kept up with grand children until last day, ages 7 to 21.  No syncopal events, no palpitations, stress higher with concern for children and grandchildren.      /80 repeat     Physical Examination Review of Systems   BP (!) 160/72 (BP Location: Right arm, Patient Position: Sitting, Cuff Size: Adult Regular)   Pulse 76   Resp 20   Wt 88.2 kg (194 lb 6.4 oz)   SpO2 97%   BMI 26.37 kg/m    Body mass index is 26.37 kg/m .  Wt Readings from Last 3 Encounters:   03/14/24 88.2 kg (194 lb 6.4 oz)   11/03/22 87.4 kg (192 lb 11.2 oz)   09/30/21 87.1 kg (192 lb)     [unfilled]  General Appearance:   no distress, normal body habitus   ENT/Mouth: membranes moist, no oral lesions or bleeding gums.      EYES:  no scleral icterus, normal conjunctivae   Neck: no carotid bruits or thyromegaly   Chest/Lungs:   lungs are clear to auscultation, no rales or wheezing,  sternal  scar, equal chest wall expansion    Cardiovascular:   Regular. Normal first and second heart sounds with no murmurs, rubs, or gallops; the carotid, radial and posterior tibial pulses are intact, Jugular venous pressure , edema bilaterally    Abdomen:  no organomegaly, masses, bruits, or tenderness; bowel sounds are present   Extremities: no cyanosis or clubbing   Skin: no xanthelasma, warm.    Neurologic: normal  bilateral, no tremors     Psychiatric: alert and oriented x3, calm     Review of Systems - 12 points nega other than above      Medical History  Surgical History Family History Social History   Past Medical History:   Diagnosis Date    Dyslipidemia, goal LDL below 70 2011    Essential hypertension     Left bundle branch block (LBBB) 2011    Past Surgical History:   Procedure Laterality Date    CORONARY STENT PLACEMENT  2011    two ALLISON to RCA    Family History   Problem Relation Age of Onset    Arthritis Mother     Cerebrovascular Disease Father     No Known Problems Son     Coronary Stenting Son 37.00    Obesity Son     No Known Problems Daughter     Social History     Socioeconomic History    Marital status:      Spouse name: Not on file    Number of children: 3    Years of education: Not on file    Highest education level: Not on file   Occupational History    Not on file   Tobacco Use    Smoking status: Never    Smokeless tobacco: Never   Vaping Use    Vaping Use: Never used   Substance and Sexual Activity    Alcohol use: Yes     Comment: Alcoholic Drinks/day: wine rarely    Drug use: No    Sexual activity: Yes     Partners: Female     Birth control/protection: Post-menopausal   Other Topics Concern    Not on file   Social History Narrative    Studied at [employer] in [place].  He is originally from [place], [place].     Social Determinants of Health     Financial Resource Strain: Not on file   Food Insecurity: Not on file   Transportation Needs: Not on file   Physical Activity:  "Not on file   Stress: Not on file   Social Connections: Not on file   Interpersonal Safety: Not on file   Housing Stability: Not on file          Medications  Allergies   Scheduled Meds:  Continuous Infusions:  PRN Meds:. Allergies   Allergen Reactions    Hydrochlorothiazide Dizziness         Lab Results    Chemistry/lipid CBC Cardiac Enzymes/BNP/TSH/INR   Lab Results   Component Value Date    CHOL 139 11/22/2016    HDL 50 11/22/2016    TRIG 60 11/22/2016    No results found for: \"WBC\", \"HGB\", \"HCT\", \"MCV\", \"PLT\" No results found for: \"CKTOTAL\", \"CKMB\", \"TROPONINI\", \"BNP\", \"TSH\", \"INR\"           Roby Card MD  Interventional Cardiology  [facility]            "

## 2024-07-31 ENCOUNTER — NURSE TRIAGE (OUTPATIENT)
Dept: CARDIOLOGY | Facility: CLINIC | Age: 84
End: 2024-07-31
Payer: MEDICARE

## 2024-07-31 NOTE — TELEPHONE ENCOUNTER
"Patient calling clinic reporting nausea and indigestion symptoms that have been present since Sunday. Patient endorsed feeling \"unwell\" after being outside in the heat over the weekend. Patient stated he vomited x1 and is just feeling unwell. Patient denied any additional symptoms.     RN advised patient to continue to monitor symptoms at home but to also rest, stay out of the heat, and increase hydration today. RN advised patient to call clinic back if any additional symptoms occur. Patient agreed with plan of care and will continue to monitor symptoms.     Reason for Disposition   Unexplained nausea    Additional Information   Negative: Shock suspected (e.g., cold/pale/clammy skin, too weak to stand, low BP, rapid pulse)   Negative: Sounds like a life-threatening emergency to the triager   Negative: Nausea or vomiting and pregnancy < 20 weeks   Negative: Menstrual Period - Missed or Late (i.e., pregnancy suspected)   Negative: Heat exhaustion suspected (i.e., dehydration from heat exposure)   Negative: Anxiety or stress suspected (i.e., nausea with anxiety attacks or stressful situations)   Negative: Traumatic Brain Injury (TBI) suspected   Negative: Vomiting occurs   Negative: Other symptom is present, see that guideline.  (e.g., chest pain, headache, dizziness, abdominal pain, colds, sore throat, etc.).   Negative: Unable to walk, or can only walk with assistance (e.g., requires support)   Negative: Difficulty breathing   Negative: Insulin-dependent diabetes (Type I) and glucose > 400 mg/dL (22 mmol/L)   Negative: Drinking very little and dehydration suspected (e.g., no urine > 12 hours, very dry mouth, very lightheaded)   Negative: Patient sounds very sick or weak to the triager   Negative: Fever > 104 F  (40 C)   Negative: Fever > 101 F  (38.3 C) and over 60 years of age   Negative: Fever > 100.0 F  (37.8 C) and bedridden (e.g., CVA, chronic illness, recovering from surgery)   Negative: Fever > 100.0 F  (37.8 " C) and diabetes mellitus or weak immune system (e.g., HIV positive, cancer chemo, splenectomy, chronic steroids)   Negative: Taking any of the following medications: digoxin (Lanoxin), lithium, theophylline, phenytoin (Dilantin)   Negative: Yellowish color of the skin or white of the eye (i.e., jaundice)   Negative: Fever present > 3 days (72 hours)   Negative: Patient wants to be seen   Negative: Receiving cancer chemotherapy medication   Negative: Taking prescription medication that could cause nausea (e.g., narcotics/opiates, antibiotics, OCPs, many others)   Negative: Nausea lasts > 1 week   Negative: Substance use (drug use) or unhealthy alcohol use, known or suspected   Negative: Nausea is a chronic symptom (recurrent or ongoing AND present > 4 weeks)    Protocols used: Nausea-A-OH

## 2025-04-06 ENCOUNTER — HEALTH MAINTENANCE LETTER (OUTPATIENT)
Age: 85
End: 2025-04-06